# Patient Record
Sex: FEMALE | Race: BLACK OR AFRICAN AMERICAN | NOT HISPANIC OR LATINO | URBAN - METROPOLITAN AREA
[De-identification: names, ages, dates, MRNs, and addresses within clinical notes are randomized per-mention and may not be internally consistent; named-entity substitution may affect disease eponyms.]

---

## 2017-03-01 ENCOUNTER — OUTPATIENT (OUTPATIENT)
Dept: OUTPATIENT SERVICES | Facility: HOSPITAL | Age: 82
LOS: 1 days | End: 2017-03-01
Payer: MEDICAID

## 2017-03-07 DIAGNOSIS — R69 ILLNESS, UNSPECIFIED: ICD-10-CM

## 2017-06-01 PROCEDURE — G9001: CPT

## 2018-01-16 ENCOUNTER — INPATIENT (INPATIENT)
Facility: HOSPITAL | Age: 83
LOS: 1 days | Discharge: HOME | End: 2018-01-18
Attending: INTERNAL MEDICINE | Admitting: INTERNAL MEDICINE
Payer: MEDICARE

## 2018-01-16 VITALS
RESPIRATION RATE: 16 BRPM | DIASTOLIC BLOOD PRESSURE: 93 MMHG | OXYGEN SATURATION: 96 % | HEIGHT: 63 IN | TEMPERATURE: 99 F | SYSTOLIC BLOOD PRESSURE: 189 MMHG | WEIGHT: 139.99 LBS | HEART RATE: 89 BPM

## 2018-01-16 DIAGNOSIS — N60.01 SOLITARY CYST OF RIGHT BREAST: Chronic | ICD-10-CM

## 2018-01-16 DIAGNOSIS — I10 ESSENTIAL (PRIMARY) HYPERTENSION: ICD-10-CM

## 2018-01-16 DIAGNOSIS — R74.8 ABNORMAL LEVELS OF OTHER SERUM ENZYMES: ICD-10-CM

## 2018-01-16 DIAGNOSIS — E11.9 TYPE 2 DIABETES MELLITUS WITHOUT COMPLICATIONS: ICD-10-CM

## 2018-01-16 DIAGNOSIS — E78.5 HYPERLIPIDEMIA, UNSPECIFIED: ICD-10-CM

## 2018-01-16 DIAGNOSIS — E16.2 HYPOGLYCEMIA, UNSPECIFIED: ICD-10-CM

## 2018-01-16 LAB
ALBUMIN SERPL ELPH-MCNC: 2.9 G/DL — LOW (ref 3.3–5)
ALP SERPL-CCNC: 95 U/L — SIGNIFICANT CHANGE UP (ref 40–120)
ALT FLD-CCNC: 18 U/L — SIGNIFICANT CHANGE UP (ref 12–78)
ANION GAP SERPL CALC-SCNC: 8 MMOL/L — SIGNIFICANT CHANGE UP (ref 5–17)
APPEARANCE UR: CLEAR — SIGNIFICANT CHANGE UP
APTT BLD: 29.3 SEC — SIGNIFICANT CHANGE UP (ref 27.5–37.4)
AST SERPL-CCNC: 9 U/L — LOW (ref 15–37)
BACTERIA # UR AUTO: ABNORMAL
BASOPHILS # BLD AUTO: 0.1 K/UL — SIGNIFICANT CHANGE UP (ref 0–0.2)
BASOPHILS NFR BLD AUTO: 0.9 % — SIGNIFICANT CHANGE UP (ref 0–2)
BILIRUB SERPL-MCNC: 0.2 MG/DL — SIGNIFICANT CHANGE UP (ref 0.2–1.2)
BILIRUB UR-MCNC: NEGATIVE — SIGNIFICANT CHANGE UP
BUN SERPL-MCNC: 8 MG/DL — SIGNIFICANT CHANGE UP (ref 7–23)
CALCIUM SERPL-MCNC: 8.4 MG/DL — LOW (ref 8.5–10.1)
CHLORIDE SERPL-SCNC: 109 MMOL/L — HIGH (ref 96–108)
CK MB BLD-MCNC: 2.5 % — SIGNIFICANT CHANGE UP (ref 0–3.5)
CK MB CFR SERPL CALC: 1.8 NG/ML — SIGNIFICANT CHANGE UP (ref 0.5–3.6)
CK SERPL-CCNC: 73 U/L — SIGNIFICANT CHANGE UP (ref 26–192)
CO2 SERPL-SCNC: 24 MMOL/L — SIGNIFICANT CHANGE UP (ref 22–31)
COLOR SPEC: YELLOW — SIGNIFICANT CHANGE UP
CREAT SERPL-MCNC: 0.92 MG/DL — SIGNIFICANT CHANGE UP (ref 0.5–1.3)
DIFF PNL FLD: NEGATIVE — SIGNIFICANT CHANGE UP
EOSINOPHIL # BLD AUTO: 0 K/UL — SIGNIFICANT CHANGE UP (ref 0–0.5)
EOSINOPHIL NFR BLD AUTO: 0.5 % — SIGNIFICANT CHANGE UP (ref 0–6)
EPI CELLS # UR: ABNORMAL
GLUCOSE BLDC GLUCOMTR-MCNC: 117 MG/DL — HIGH (ref 70–99)
GLUCOSE BLDC GLUCOMTR-MCNC: 140 MG/DL — HIGH (ref 70–99)
GLUCOSE BLDC GLUCOMTR-MCNC: 183 MG/DL — HIGH (ref 70–99)
GLUCOSE BLDC GLUCOMTR-MCNC: 210 MG/DL — HIGH (ref 70–99)
GLUCOSE BLDC GLUCOMTR-MCNC: 93 MG/DL — SIGNIFICANT CHANGE UP (ref 70–99)
GLUCOSE SERPL-MCNC: 126 MG/DL — HIGH (ref 70–99)
GLUCOSE UR QL: NEGATIVE MG/DL — SIGNIFICANT CHANGE UP
HCT VFR BLD CALC: 34.7 % — SIGNIFICANT CHANGE UP (ref 34.5–45)
HGB BLD-MCNC: 11 G/DL — LOW (ref 11.5–15.5)
INR BLD: 1.23 RATIO — HIGH (ref 0.88–1.16)
KETONES UR-MCNC: NEGATIVE — SIGNIFICANT CHANGE UP
LACTATE SERPL-SCNC: 1.7 MMOL/L — SIGNIFICANT CHANGE UP (ref 0.7–2)
LEUKOCYTE ESTERASE UR-ACNC: ABNORMAL
LYMPHOCYTES # BLD AUTO: 1.2 K/UL — SIGNIFICANT CHANGE UP (ref 1–3.3)
LYMPHOCYTES # BLD AUTO: 12.8 % — LOW (ref 13–44)
MCHC RBC-ENTMCNC: 24.8 PG — LOW (ref 27–34)
MCHC RBC-ENTMCNC: 31.7 GM/DL — LOW (ref 32–36)
MCV RBC AUTO: 78.3 FL — LOW (ref 80–100)
MONOCYTES # BLD AUTO: 0.7 K/UL — SIGNIFICANT CHANGE UP (ref 0–0.9)
MONOCYTES NFR BLD AUTO: 7.3 % — SIGNIFICANT CHANGE UP (ref 2–14)
NEUTROPHILS # BLD AUTO: 7.4 K/UL — SIGNIFICANT CHANGE UP (ref 1.8–7.4)
NEUTROPHILS NFR BLD AUTO: 78.5 % — HIGH (ref 43–77)
NITRITE UR-MCNC: NEGATIVE — SIGNIFICANT CHANGE UP
PH UR: 5 — SIGNIFICANT CHANGE UP (ref 5–8)
PLATELET # BLD AUTO: 250 K/UL — SIGNIFICANT CHANGE UP (ref 150–400)
POTASSIUM SERPL-MCNC: 3.8 MMOL/L — SIGNIFICANT CHANGE UP (ref 3.5–5.3)
POTASSIUM SERPL-SCNC: 3.8 MMOL/L — SIGNIFICANT CHANGE UP (ref 3.5–5.3)
PROT SERPL-MCNC: 6.9 GM/DL — SIGNIFICANT CHANGE UP (ref 6–8.3)
PROT UR-MCNC: NEGATIVE MG/DL — SIGNIFICANT CHANGE UP
PROTHROM AB SERPL-ACNC: 13.5 SEC — HIGH (ref 9.8–12.7)
RBC # BLD: 4.43 M/UL — SIGNIFICANT CHANGE UP (ref 3.8–5.2)
RBC # FLD: 15 % — SIGNIFICANT CHANGE UP (ref 11–15)
SODIUM SERPL-SCNC: 141 MMOL/L — SIGNIFICANT CHANGE UP (ref 135–145)
SP GR SPEC: 1 — LOW (ref 1.01–1.02)
TROPONIN I SERPL-MCNC: 0.07 NG/ML — HIGH (ref 0.01–0.04)
TROPONIN I SERPL-MCNC: 0.07 NG/ML — HIGH (ref 0.01–0.04)
UROBILINOGEN FLD QL: NEGATIVE MG/DL — SIGNIFICANT CHANGE UP
WBC # BLD: 9.5 K/UL — SIGNIFICANT CHANGE UP (ref 3.8–10.5)
WBC # FLD AUTO: 9.5 K/UL — SIGNIFICANT CHANGE UP (ref 3.8–10.5)
WBC UR QL: SIGNIFICANT CHANGE UP

## 2018-01-16 PROCEDURE — 99285 EMERGENCY DEPT VISIT HI MDM: CPT

## 2018-01-16 PROCEDURE — 99223 1ST HOSP IP/OBS HIGH 75: CPT

## 2018-01-16 PROCEDURE — 71045 X-RAY EXAM CHEST 1 VIEW: CPT | Mod: 26

## 2018-01-16 PROCEDURE — 93010 ELECTROCARDIOGRAM REPORT: CPT

## 2018-01-16 PROCEDURE — 70450 CT HEAD/BRAIN W/O DYE: CPT | Mod: 26

## 2018-01-16 RX ORDER — DEXTROSE 50 % IN WATER 50 %
25 SYRINGE (ML) INTRAVENOUS ONCE
Qty: 0 | Refills: 0 | Status: DISCONTINUED | OUTPATIENT
Start: 2018-01-16 | End: 2018-01-18

## 2018-01-16 RX ORDER — ENOXAPARIN SODIUM 100 MG/ML
40 INJECTION SUBCUTANEOUS EVERY 24 HOURS
Qty: 0 | Refills: 0 | Status: DISCONTINUED | OUTPATIENT
Start: 2018-01-16 | End: 2018-01-18

## 2018-01-16 RX ORDER — LOSARTAN POTASSIUM 100 MG/1
100 TABLET, FILM COATED ORAL DAILY
Qty: 0 | Refills: 0 | Status: DISCONTINUED | OUTPATIENT
Start: 2018-01-16 | End: 2018-01-18

## 2018-01-16 RX ORDER — DEXTROSE 50 % IN WATER 50 %
12.5 SYRINGE (ML) INTRAVENOUS ONCE
Qty: 0 | Refills: 0 | Status: DISCONTINUED | OUTPATIENT
Start: 2018-01-16 | End: 2018-01-18

## 2018-01-16 RX ORDER — LABETALOL HCL 100 MG
20 TABLET ORAL ONCE
Qty: 0 | Refills: 0 | Status: DISCONTINUED | OUTPATIENT
Start: 2018-01-16 | End: 2018-01-16

## 2018-01-16 RX ORDER — INSULIN LISPRO 100/ML
VIAL (ML) SUBCUTANEOUS AT BEDTIME
Qty: 0 | Refills: 0 | Status: DISCONTINUED | OUTPATIENT
Start: 2018-01-16 | End: 2018-01-18

## 2018-01-16 RX ORDER — METOPROLOL TARTRATE 50 MG
50 TABLET ORAL DAILY
Qty: 0 | Refills: 0 | Status: DISCONTINUED | OUTPATIENT
Start: 2018-01-16 | End: 2018-01-18

## 2018-01-16 RX ORDER — ATORVASTATIN CALCIUM 80 MG/1
40 TABLET, FILM COATED ORAL AT BEDTIME
Qty: 0 | Refills: 0 | Status: DISCONTINUED | OUTPATIENT
Start: 2018-01-16 | End: 2018-01-18

## 2018-01-16 RX ORDER — DEXTROSE 50 % IN WATER 50 %
1 SYRINGE (ML) INTRAVENOUS ONCE
Qty: 0 | Refills: 0 | Status: DISCONTINUED | OUTPATIENT
Start: 2018-01-16 | End: 2018-01-18

## 2018-01-16 RX ORDER — GLUCAGON INJECTION, SOLUTION 0.5 MG/.1ML
1 INJECTION, SOLUTION SUBCUTANEOUS ONCE
Qty: 0 | Refills: 0 | Status: DISCONTINUED | OUTPATIENT
Start: 2018-01-16 | End: 2018-01-18

## 2018-01-16 RX ORDER — SODIUM CHLORIDE 9 MG/ML
1000 INJECTION, SOLUTION INTRAVENOUS
Qty: 0 | Refills: 0 | Status: DISCONTINUED | OUTPATIENT
Start: 2018-01-16 | End: 2018-01-18

## 2018-01-16 RX ORDER — INSULIN LISPRO 100/ML
VIAL (ML) SUBCUTANEOUS
Qty: 0 | Refills: 0 | Status: DISCONTINUED | OUTPATIENT
Start: 2018-01-16 | End: 2018-01-18

## 2018-01-16 RX ORDER — LABETALOL HCL 100 MG
10 TABLET ORAL ONCE
Qty: 0 | Refills: 0 | Status: COMPLETED | OUTPATIENT
Start: 2018-01-16 | End: 2018-01-16

## 2018-01-16 RX ORDER — PANTOPRAZOLE SODIUM 20 MG/1
40 TABLET, DELAYED RELEASE ORAL
Qty: 0 | Refills: 0 | Status: DISCONTINUED | OUTPATIENT
Start: 2018-01-16 | End: 2018-01-18

## 2018-01-16 RX ORDER — SODIUM CHLORIDE 9 MG/ML
1000 INJECTION, SOLUTION INTRAVENOUS
Qty: 0 | Refills: 0 | Status: COMPLETED | OUTPATIENT
Start: 2018-01-16 | End: 2018-01-16

## 2018-01-16 RX ORDER — HYDRALAZINE HCL 50 MG
50 TABLET ORAL DAILY
Qty: 0 | Refills: 0 | Status: DISCONTINUED | OUTPATIENT
Start: 2018-01-16 | End: 2018-01-18

## 2018-01-16 RX ORDER — ASPIRIN/CALCIUM CARB/MAGNESIUM 324 MG
325 TABLET ORAL ONCE
Qty: 0 | Refills: 0 | Status: COMPLETED | OUTPATIENT
Start: 2018-01-16 | End: 2018-01-16

## 2018-01-16 RX ADMIN — Medication 10 MILLIGRAM(S): at 12:21

## 2018-01-16 RX ADMIN — SODIUM CHLORIDE 75 MILLILITER(S): 9 INJECTION, SOLUTION INTRAVENOUS at 15:10

## 2018-01-16 RX ADMIN — Medication 50 MILLIGRAM(S): at 15:57

## 2018-01-16 RX ADMIN — LOSARTAN POTASSIUM 100 MILLIGRAM(S): 100 TABLET, FILM COATED ORAL at 17:14

## 2018-01-16 RX ADMIN — Medication 10 MILLIGRAM(S): at 22:52

## 2018-01-16 RX ADMIN — ATORVASTATIN CALCIUM 40 MILLIGRAM(S): 80 TABLET, FILM COATED ORAL at 21:53

## 2018-01-16 RX ADMIN — Medication 50 MILLIGRAM(S): at 15:58

## 2018-01-16 RX ADMIN — PANTOPRAZOLE SODIUM 40 MILLIGRAM(S): 20 TABLET, DELAYED RELEASE ORAL at 15:57

## 2018-01-16 RX ADMIN — Medication 325 MILLIGRAM(S): at 13:47

## 2018-01-16 NOTE — H&P ADULT - NSHPLABSRESULTS_GEN_ALL_CORE
11.0   9.5   )-----------( 250      ( 16 Jan 2018 11:50 )             34.7   01-16    141  |  109<H>  |  8   ----------------------------<  126<H>  3.8   |  24  |  0.92    Ca    8.4<L>      16 Jan 2018 11:50    TPro  6.9  /  Alb  2.9<L>  /  TBili  0.2  /  DBili  x   /  AST  9<L>  /  ALT  18  /  AlkPhos  95  01-16    < from: Xray Chest 1 View AP/PA. (01.16.18 @ 13:44) >    upport Devices: None  Heart: Cardiomegaly  Mediastinum:  Unremarkable  Lungs/Airways:  Unremarkable  Bones/Soft tissues: Unremarkable      < from: CT Head No Cont (01.16.18 @ 12:49) >    No acute intracranial hemorrhage, mass effect or midline shift. 11.0   9.5   )-----------( 250      ( 16 Jan 2018 11:50 )             34.7   01-16    141  |  109<H>  |  8   ----------------------------<  126<H>  3.8   |  24  |  0.92    Ca    8.4<L>      16 Jan 2018 11:50    TPro  6.9  /  Alb  2.9<L>  /  TBili  0.2  /  DBili  x   /  AST  9<L>  /  ALT  18  /  AlkPhos  95  01-16    < from: Xray Chest 1 View AP/PA. (01.16.18 @ 13:44) >    upport Devices: None  Heart: Cardiomegaly  Mediastinum:  Unremarkable  Lungs/Airways:  Unremarkable  Bones/Soft tissues: Unremarkable    ekg- assymetic t wave inversioin inf/lat       < from: CT Head No Cont (01.16.18 @ 12:49) >    No acute intracranial hemorrhage, mass effect or midline shift.

## 2018-01-16 NOTE — ED ADULT NURSE NOTE - OBJECTIVE STATEMENT
per granddaughter at bedside pt  was asleep past 10 am which is not normal for her, trying to wake her up she was confused and not making sense per son pt was c/o abdominal distention over the weekend and decrease appetite denies any urinary symptoms

## 2018-01-16 NOTE — H&P ADULT - HISTORY OF PRESENT ILLNESS
83 years old female by ems with grand daughter and son sts pt was found lethargic and confused in bed with home aid sleeping next to pt this afternoon last seen ok was last night. EMS arrived the blood sugar was low and pt received dextro 50 % one amp and pt now is back to her normal mental status. Pt sts she did not eat breakfast and lunch today. Pt also sts did not take any of her medications this morning. Pt denies headache, dizziness, blurred visions, lights sensitivities, focal/distal weakness or numbness, cough, sob, chest pain, nausea, vomiting, fever, chills, abd pain, dysuria or irregular bowel movements. 83 years old female by ems with grand daughter and son sts pt was found lethargic and confused in bed with home aid sleeping next to pt this afternoon last seen ok was last night. EMS arrived the blood sugar was low and pt received dextro 50 % one amp and pt now is back to her normal mental status. Pt sts she did not eat breakfast and lunch todaybut ate well the night before . Pt also sts did not take any of her medications this morning. patient states she had not felt well lately and was complain of headaches and arm shaking and was supposed to see her pmd later in hte week  Pt denies headache, dizziness, blurred visions, lights sensitivities, focal/distal weakness or numbness, cough, sob, chest pain, nausea, vomiting, fever, chills, abdominal pain, dysuria or irregular bowel movements.

## 2018-01-16 NOTE — ED PROVIDER NOTE - CARE PLAN
Principal Discharge DX:	Hypoglycemia Principal Discharge DX:	Hypoglycemia  Secondary Diagnosis:	Essential hypertension

## 2018-01-16 NOTE — ED PROVIDER NOTE - CONSTITUTIONAL, MLM
normal... Well appearing, well nourished, awake, alert, oriented to person, place, time/situation and in no apparent distress. Speaking in clear full sentences no nasal flaring no shoulders retractions, smiling appears very comfortable sitting up in the stretcher in a bright light room

## 2018-01-16 NOTE — H&P ADULT - ASSESSMENT
83f           IMPROVE VTE Individual Risk Assessment          RISK                                                          Points    [  ] Previous VTE                                                3    [  ] Thrombophilia                                             2    [  ] Lower limb paralysis                                    2        (unable to hold up >15 seconds)      [  ] Current Cancer                                             2         (within 6 months)    [  ] Immobilization > 24 hrs                              1    [  ] ICU/CCU stay > 24 hours                            1    [  ] Age > 60                                                    1    IMPROVE VTE Score _________ 83f           IMPROVE VTE Individual Risk Assessment          RISK                                                          Points    [  ] Previous VTE                                                3    [  ] Thrombophilia                                             2    [  ] Lower limb paralysis                                    2        (unable to hold up >15 seconds)      [  ] Current Cancer                                             2         (within 6 months)    [x  ] Immobilization > 24 hrs                              1    [  ] ICU/CCU stay > 24 hours                            1    [  x] Age > 60                                                    1    IMPROVE VTE Score _________2

## 2018-01-16 NOTE — ED ADULT NURSE NOTE - CHIEF COMPLAINT QUOTE
hypoglycemia and lethargic  at home ems gave 1 amp D50 with results pt alert speaks creole granddaughter states better, fs 140 # 20g rt ac last normal last night

## 2018-01-16 NOTE — ED PROVIDER NOTE - PROGRESS NOTE DETAILS
Pt is alert and oriented x 3 accu check 93 pt denies headache, dizziness, sob, chest pain, nausea, vomiting, abd pain.

## 2018-01-16 NOTE — ED PROVIDER NOTE - PROGRESS NOTE
He can go online and get medications from [facility] Asacol 400 mg 2 capsules twice a day. The only other alternative is azulfidine a sulfa drug which carries the disadvantage of allergic reactions. IF he would like to em this prescribe azulfidine 50 mg 2 tablets twice a day with Folic Acid 1 mg a day   Stable.

## 2018-01-16 NOTE — ED ADULT TRIAGE NOTE - CHIEF COMPLAINT QUOTE
hypoglycemia and lethargic  at home ems gave 1 amp D50 with results pt alert speaks creole granddaughter states better, fs 140 # 20g rt ac hypoglycemia and lethargic  at home ems gave 1 amp D50 with results pt alert speaks creole granddaughter states better, fs 140 # 20g rt ac last normal last night

## 2018-01-16 NOTE — ED PROVIDER NOTE - OBJECTIVE STATEMENT
83 years old female by ems with grand daughter and son sts pt was found lethargic and confused in bed with home aid sleeping next to pt this afternoon last seen ok was last night. EMS arrived the blood sugar was low and pt received dextro 50 % one amp and pt now is back to her normal mental status. Pt sts she did not eat breakfast and lunch today. Pt also sts did not take any of her medications this morning. Pt denies headache, dizziness, blurred visions, lights sensitivities, focal/distal weakness or numbness, cough, sob, chest pain, nausea, vomiting, fever, chills, abd pain, dysuria or irregular bowel movements.

## 2018-01-17 LAB
ANION GAP SERPL CALC-SCNC: 11 MMOL/L — SIGNIFICANT CHANGE UP (ref 5–17)
BUN SERPL-MCNC: 12 MG/DL — SIGNIFICANT CHANGE UP (ref 7–23)
CALCIUM SERPL-MCNC: 8.9 MG/DL — SIGNIFICANT CHANGE UP (ref 8.5–10.1)
CHLORIDE SERPL-SCNC: 106 MMOL/L — SIGNIFICANT CHANGE UP (ref 96–108)
CO2 SERPL-SCNC: 22 MMOL/L — SIGNIFICANT CHANGE UP (ref 22–31)
CREAT SERPL-MCNC: 0.82 MG/DL — SIGNIFICANT CHANGE UP (ref 0.5–1.3)
CULTURE RESULTS: SIGNIFICANT CHANGE UP
GLUCOSE BLDC GLUCOMTR-MCNC: 192 MG/DL — HIGH (ref 70–99)
GLUCOSE BLDC GLUCOMTR-MCNC: 217 MG/DL — HIGH (ref 70–99)
GLUCOSE BLDC GLUCOMTR-MCNC: 223 MG/DL — HIGH (ref 70–99)
GLUCOSE SERPL-MCNC: 209 MG/DL — HIGH (ref 70–99)
HBA1C BLD-MCNC: 6.5 % — HIGH (ref 4–5.6)
HCT VFR BLD CALC: 37.7 % — SIGNIFICANT CHANGE UP (ref 34.5–45)
HGB BLD-MCNC: 12.1 G/DL — SIGNIFICANT CHANGE UP (ref 11.5–15.5)
MCHC RBC-ENTMCNC: 24.8 PG — LOW (ref 27–34)
MCHC RBC-ENTMCNC: 32.1 GM/DL — SIGNIFICANT CHANGE UP (ref 32–36)
MCV RBC AUTO: 77.3 FL — LOW (ref 80–100)
PLATELET # BLD AUTO: 272 K/UL — SIGNIFICANT CHANGE UP (ref 150–400)
POTASSIUM SERPL-MCNC: 3.6 MMOL/L — SIGNIFICANT CHANGE UP (ref 3.5–5.3)
POTASSIUM SERPL-SCNC: 3.6 MMOL/L — SIGNIFICANT CHANGE UP (ref 3.5–5.3)
RBC # BLD: 4.87 M/UL — SIGNIFICANT CHANGE UP (ref 3.8–5.2)
RBC # FLD: 15.6 % — HIGH (ref 11–15)
SODIUM SERPL-SCNC: 139 MMOL/L — SIGNIFICANT CHANGE UP (ref 135–145)
SPECIMEN SOURCE: SIGNIFICANT CHANGE UP
TROPONIN I SERPL-MCNC: 0.03 NG/ML — SIGNIFICANT CHANGE UP (ref 0.01–0.04)
WBC # BLD: 11.6 K/UL — HIGH (ref 3.8–10.5)
WBC # FLD AUTO: 11.6 K/UL — HIGH (ref 3.8–10.5)

## 2018-01-17 PROCEDURE — 99233 SBSQ HOSP IP/OBS HIGH 50: CPT

## 2018-01-17 PROCEDURE — 99232 SBSQ HOSP IP/OBS MODERATE 35: CPT

## 2018-01-17 RX ORDER — METOPROLOL TARTRATE 50 MG
1 TABLET ORAL
Qty: 0 | Refills: 0 | COMMUNITY

## 2018-01-17 RX ORDER — OMEPRAZOLE 10 MG/1
1 CAPSULE, DELAYED RELEASE ORAL
Qty: 0 | Refills: 0 | COMMUNITY

## 2018-01-17 RX ORDER — HYDRALAZINE HCL 50 MG
0 TABLET ORAL
Qty: 0 | Refills: 0 | COMMUNITY

## 2018-01-17 RX ORDER — LOSARTAN POTASSIUM 100 MG/1
1 TABLET, FILM COATED ORAL
Qty: 0 | Refills: 0 | COMMUNITY

## 2018-01-17 RX ORDER — HYDRALAZINE HCL 50 MG
10 TABLET ORAL ONCE
Qty: 0 | Refills: 0 | Status: COMPLETED | OUTPATIENT
Start: 2018-01-17 | End: 2018-01-17

## 2018-01-17 RX ORDER — ROSUVASTATIN CALCIUM 5 MG/1
1 TABLET ORAL
Qty: 0 | Refills: 0 | COMMUNITY

## 2018-01-17 RX ADMIN — PANTOPRAZOLE SODIUM 40 MILLIGRAM(S): 20 TABLET, DELAYED RELEASE ORAL at 07:00

## 2018-01-17 RX ADMIN — LOSARTAN POTASSIUM 100 MILLIGRAM(S): 100 TABLET, FILM COATED ORAL at 07:00

## 2018-01-17 RX ADMIN — Medication 50 MILLIGRAM(S): at 07:00

## 2018-01-17 RX ADMIN — ENOXAPARIN SODIUM 40 MILLIGRAM(S): 100 INJECTION SUBCUTANEOUS at 09:19

## 2018-01-17 RX ADMIN — Medication 4: at 09:18

## 2018-01-17 RX ADMIN — Medication 2: at 16:40

## 2018-01-17 RX ADMIN — Medication 10 MILLIGRAM(S): at 01:20

## 2018-01-17 RX ADMIN — Medication 50 MILLIGRAM(S): at 07:01

## 2018-01-17 RX ADMIN — ATORVASTATIN CALCIUM 40 MILLIGRAM(S): 80 TABLET, FILM COATED ORAL at 22:07

## 2018-01-17 RX ADMIN — Medication 10 MILLIGRAM(S): at 05:28

## 2018-01-17 RX ADMIN — Medication 4: at 11:31

## 2018-01-17 NOTE — CONSULT NOTE ADULT - SUBJECTIVE AND OBJECTIVE BOX
CARDIOLOGY CONSULT NOTE    18 @ 12:16  THANG BARTH is a 83y Female with a known history of :  Elevated troponin (R74.8)  Type 2 diabetes mellitus without complication, without long-term current use of insulin (E11.9)  Dyslipidemia (E78.5)  Essential hypertension (I10)  Hypoglycemia (E16.2)    HPI:  83 years old female by ems with grand daughter and son sts pt was found lethargic and confused in bed with home aid sleeping next to pt this afternoon last seen ok was last night. EMS arrived the blood sugar was low and pt received dextro 50 % one amp and pt now is back to her normal mental status. Pt sts she did not eat breakfast and lunch todaybut ate well the night before . Pt also sts did not take any of her medications this morning. patient states she had not felt well lately and was complain of headaches and arm shaking and was supposed to see her pmd later in hte week  Pt denies headache, dizziness, blurred visions, lights sensitivities, focal/distal weakness or numbness, cough, sob, chest pain, nausea, vomiting, fever, chills, abdominal pain, dysuria or irregular bowel movements. (2018 14:48)      REVIEW OF SYSTEMS:    CONSTITUTIONAL: No fever, weight loss, or fatigue  EYES: No eye pain, visual disturbances, or discharge  ENMT:  No difficulty hearing, tinnitus, vertigo; No sinus or throat pain  NECK: No pain or stiffness  BREASTS: No pain, masses, or nipple discharge  RESPIRATORY: No cough, wheezing, chills or hemoptysis; No shortness of breath  CARDIOVASCULAR: No chest pain, palpitations, dizziness, or leg swelling  GASTROINTESTINAL: No abdominal or epigastric pain. No nausea, vomiting, or hematemesis; No diarrhea or constipation. No melena or hematochezia.  GENITOURINARY: No dysuria, frequency, hematuria, or incontinence  NEUROLOGICAL: No headaches, memory loss, loss of strength, numbness, or tremors  SKIN: No itching, burning, rashes, or lesions   LYMPH NODES: No enlarged glands  ENDOCRINE: No heat or cold intolerance; No hair loss  MUSCULOSKELETAL: No joint pain or swelling; No muscle, back, or extremity pain  PSYCHIATRIC: No depression, anxiety, mood swings, or difficulty sleeping  HEME/LYMPH: No easy bruising, or bleeding gums  ALLERGY AND IMMUNOLOGIC: No hives or eczema    MEDICATIONS  (STANDING):  atorvastatin 40 milliGRAM(s) Oral at bedtime  dextrose 5%. 1000 milliLiter(s) (50 mL/Hr) IV Continuous <Continuous>  dextrose 50% Injectable 12.5 Gram(s) IV Push once  dextrose 50% Injectable 25 Gram(s) IV Push once  dextrose 50% Injectable 25 Gram(s) IV Push once  enoxaparin Injectable 40 milliGRAM(s) SubCutaneous every 24 hours  hydrALAZINE 50 milliGRAM(s) Oral daily  insulin lispro (HumaLOG) corrective regimen sliding scale   SubCutaneous three times a day before meals  insulin lispro (HumaLOG) corrective regimen sliding scale   SubCutaneous at bedtime  losartan 100 milliGRAM(s) Oral daily  metoprolol succinate ER 50 milliGRAM(s) Oral daily  pantoprazole    Tablet 40 milliGRAM(s) Oral before breakfast    MEDICATIONS  (PRN):  dextrose Gel 1 Dose(s) Oral once PRN Blood Glucose LESS THAN 70 milliGRAM(s)/deciliter  glucagon  Injectable 1 milliGRAM(s) IntraMuscular once PRN Glucose LESS THAN 70 milligrams/deciliter    Crestor 10 mg oral tablet: 1 tab(s) orally once a day (at bedtime)  glyBURIDE 5 mg oral tablet: orally 2 times a day  hydrALAZINE 50 mg oral tablet: orally once a day  losartan 100 mg oral tablet: 1 tab(s) orally once a day  metFORMIN 1000 mg oral tablet: 1 tab(s) orally 2 times a day  Metoprolol Succinate ER 50 mg oral tablet, extended release: 1 tab(s) orally once a day  omeprazole 40 mg oral delayed release capsule: 1 cap(s) orally once a day    ALLERGIES: No Known Allergies      FAMILY HISTORY: FAMILY HISTORY:  No pertinent family history in first degree relatives      PHYSICAL EXAMINATION:  -----------------------------  T(C): 28.1 (18 @ 09:49), Max: 37.1 (18 @ 05:34)  HR: 101 (18 @ 09:49) (76 - 101)  BP: 185/91 (18 @ 09:49) (161/83 - 227/97)  RR: 18 (18 @ 09:49) (14 - 30)  SpO2: 98% (18 @ 09:49) (94% - 98%)  Wt(kg): --    Height (cm): 152.4 ( @ 02:30)  Weight (kg): 74.5 ( @ 02:30)  BMI (kg/m2): 32.1 ( @ 02:30)  BSA (m2): 1.72 ( @ 02:30)    Constitutional: well developed, normal appearance, well groomed, well nourished, no deformities and no acute distress.   Eyes: the conjunctiva exhibited no abnormalities and the eyelids demonstrated no xanthelasmas.   HEENT: normal oral mucosa, no oral pallor and no oral cyanosis.   Neck: normal jugular venous A waves present, normal jugular venous V waves present and no jugular venous lopez A waves.   Pulmonary: no respiratory distress, normal respiratory rhythm and effort, no accessory muscle use and lungs were clear to auscultation bilaterally.   Cardiovascular: heart rate and rhythm were normal, normal S1 and S2 and no murmur, gallop, rub, heave or thrill are present.   Abdomen: soft, non-tender, no hepato-splenomegaly and no abdominal mass palpated.   Musculoskeletal: the gait could not be assessed..   Extremities: no clubbing of the fingernails, no localized cyanosis, no petechial hemorrhages and no ischemic changes.   Skin: normal skin color and pigmentation, no rash, no venous stasis, no skin lesions, no skin ulcer and no xanthoma was observed.   Psychiatric: oriented to person, place, and time, the affect was normal, the mood was normal and not feeling anxious.     LABS:   --------      139  |  106  |  12  ----------------------------<  209<H>  3.6   |  22  |  0.82    Ca    8.9      2018 09:40    TPro  6.9  /  Alb  2.9<L>  /  TBili  0.2  /  DBili  x   /  AST  9<L>  /  ALT  18  /  AlkPhos  95                           12.1   11.6  )-----------( 272      ( 2018 09:40 )             37.7     PT/INR - ( 2018 11:50 )   PT: 13.5 sec;   INR: 1.23 ratio         PTT - ( 2018 11:50 )  PTT:29.3 sec     @ 09:40 CPK total:--, CKMB --, Troponin I - .030 ng/mL   @ 19:52 CPK total:--, CKMB --, Troponin I - .069 ng/mL<H>   @ 11:50 CPK total:--, CKMB --, Troponin I - .074 ng/mL<H>        CARDIAC MARKERS ( 2018 09:40 )  .030 ng/mL / x     / x     / x     / x      CARDIAC MARKERS ( 2018 19:52 )  .069 ng/mL / x     / x     / x     / x      CARDIAC MARKERS ( 2018 11:50 )  .074 ng/mL / x     / 73 U/L / x     / 1.8 ng/mL        RADIOLOGY:  -----------------  < from: Xray Chest 1 View AP/PA. (18 @ 13:44) >    EXAM:  XR CHEST AP OR PA 1V                            PROCEDURE DATE:  2018          INTERPRETATION:  Chest, single portable view    HISTORY: Chest pain, change in mental status    Comparison:     IMPRESSION:    Support Devices: None  Heart: Cardiomegaly  Mediastinum:  Unremarkable  Lungs/Airways:  Unremarkable  Bones/Soft tissues: Unremarkable    JACQUI ROBIN M.D., ATTENDING RADIOLOGIST  This document has been electronically signed. 2018  2:43PM        < from: Nuclear stress test, pharmacologic (08.24.10 @ 11:00) >    PATIENT: THANG BARTH  : 1934   AGE: 76 (F)   MR#: [id number]  STUDY DATE: 2010  LOCATION: [address]  REF. PHYSICIAN(S): Drew Ayala MD  FELLOW: Kyle Trujillo MD, MDANIS.  ------------------------------------------------------------------------  TYPE OF TEST: Stress Pharmacologic  DIAGNOSIS: Chest Pain, unspecified  ------------------------------------------------------------------------  REASON FOR TEST: chest pain  CARDIAC HISTORY: 76 years old female with atypical chest  and shoulder pain.  RISK FACTORS: Elevated Cholesterol, Hypertension, Post  Menopausal  MEDICATIONS: HCTZ, Crestor, aspirin, omeprazole,  amlodipine, atenolol  ------------------------------------------------------------------------  BASELINE ELECTROCARDIOGRAM:  Rhythm: Sinus Bradycardia - 44 BPM.  Q Waves: .  ST: LVH with repolarization abnormality  ------------------------------------------------------------------------  HEMODYNAMIC PARAMETERS:  Agent: Regadenoson 0.4 mg over 06:00 min.                    HR      BP  Baseline  Infusion        51  187/83  02:00     Infusion        64  165/73  04:00     Infusion        95  156/74  06:00     Infusion       106  154/83  04:00     Post Infusion   77  151/78  ------------------------------------------------------------------------  Baseline HR: 51 ; Baseline BP: 187/83  Peak Infusion HR:106; Peak Infusion BP:  mmHg  Base RPP:9,537; Peak RPP:0  Last caffeine intake: 12 hrs  Terminated: protocol completed  ------------------------------------------------------------------------  SYMPTOMS/FINDINGS:  transient headache  Chest Pain: No chest pain with administration of  Regadenoson  Treatment: None   HR Response: Appropriate   BP Response: Appropriate with baseline hypertension  ------------------------------------------------------------------------  ECG Abnormalities During/After Stress:  ECG changes could not be interpreted due to left  ventricular hypertrophy.  ------------------------------------------------------------------------  Arrhythmia: None  ------------------------------------------------------------------------  PROCEDURE DESCRIPTION:  13 mCi of Tc 99m Tetrofosmin were injected during stress  protocol. Approximately 45 minutes later, gated  tomographic images were obtained in a 180 degree arc from  right anterior oblique to left anterior oblique with 64  stops. The tomographic slices were reconstructed in 3  orthogonal planes (short axis, horizontal long axis and  vertical long axis).  Interpretation was performed both by  visual and quantitative analysis. Rest imaging was not  done.  ------------------------------------------------------------------------  NUCLEAR FINDINGS:  Normal study; no evidence for myocardial infarction or  ischemia.  The left ventricle was normal in size (not dilated), but  appears mildly hypertrophied.  ------------------------------------------------------------------------  GATED ANALYSIS:  Post-stress gated imaging was performed  (LVEF > 70%;  LVEDV = 63 ml.), revealing normal  LV function. RV  function appears normal.  ------------------------------------------------------------------------  IMPRESSIONS: Normal Study  * Indeterminate ECG evidence of ischemi - LVH  * Myocardial Perfusion SPECT results are normal.  * Nodefects at stress.  No clear evidence of ischemia or  infarct.  * Post-stress gated imaging was performed  (LVEF > 70%;  LVEDV = 63 ml.), revealing normal  LV function. RV  function appears normal.  ------------------------------------------------------------------------  Confirmed on  2010 - 16:54:37 by Eloy Moseley M.D.  ------------------------------------------------------------------------    < end of copied text >    ECG: CARDIOLOGY CONSULT NOTE    18 @ 12:16  THANG BARTH is a 83y Female with a known history of :  Elevated troponin (R74.8)  Type 2 diabetes mellitus without complication, without long-term current use of insulin (E11.9)  Dyslipidemia (E78.5)  Essential hypertension (I10)  Hypoglycemia (E16.2)    HPI:  83 years old female by ems with grand daughter and son sts pt was found lethargic and confused in bed with home aid sleeping next to pt this afternoon last seen ok was last night. EMS arrived the blood sugar was low and pt received dextro 50 % one amp and pt now is back to her normal mental status. Pt sts she did not eat breakfast and lunch today but ate well the night before . Pt also sts did not take any of her medications this morning. patient states she had not felt well lately and was complain of headaches and arm shaking and was supposed to see her pmd later in hte week  Pt denies headache, dizziness, blurred visions, lights sensitivities, focal/distal weakness or numbness, cough, sob, chest pain, nausea, vomiting, fever, chills, abdominal pain, dysuria or irregular bowel movements.  Currently her only complaint is lower back pain.      REVIEW OF SYSTEMS:    CONSTITUTIONAL: No fever, weight loss, or fatigue  EYES: No eye pain, visual disturbances, or discharge  ENMT:  No difficulty hearing, tinnitus, vertigo; No sinus or throat pain  NECK: No pain or stiffness  BREASTS: No pain, masses, or nipple discharge  RESPIRATORY: No cough, wheezing, chills or hemoptysis; No shortness of breath  CARDIOVASCULAR: No chest pain, palpitations, dizziness, or leg swelling  GASTROINTESTINAL: No abdominal or epigastric pain. No nausea, vomiting, or hematemesis; No diarrhea or constipation. No melena or hematochezia.  GENITOURINARY: No dysuria, frequency, hematuria, or incontinence  NEUROLOGICAL: No headaches, memory loss, loss of strength, numbness, or tremors  SKIN: No itching, burning, rashes, or lesions   LYMPH NODES: No enlarged glands  ENDOCRINE: No heat or cold intolerance; No hair loss  MUSCULOSKELETAL: No joint pain or swelling; No muscle, back, or extremity pain  PSYCHIATRIC: No depression, anxiety, mood swings, or difficulty sleeping  HEME/LYMPH: No easy bruising, or bleeding gums  ALLERGY AND IMMUNOLOGIC: No hives or eczema    MEDICATIONS  (STANDING):  atorvastatin 40 milliGRAM(s) Oral at bedtime  dextrose 5%. 1000 milliLiter(s) (50 mL/Hr) IV Continuous <Continuous>  dextrose 50% Injectable 12.5 Gram(s) IV Push once  dextrose 50% Injectable 25 Gram(s) IV Push once  dextrose 50% Injectable 25 Gram(s) IV Push once  enoxaparin Injectable 40 milliGRAM(s) SubCutaneous every 24 hours  hydrALAZINE 50 milliGRAM(s) Oral daily  insulin lispro (HumaLOG) corrective regimen sliding scale   SubCutaneous three times a day before meals  insulin lispro (HumaLOG) corrective regimen sliding scale   SubCutaneous at bedtime  losartan 100 milliGRAM(s) Oral daily  metoprolol succinate ER 50 milliGRAM(s) Oral daily  pantoprazole    Tablet 40 milliGRAM(s) Oral before breakfast    MEDICATIONS  (PRN):  dextrose Gel 1 Dose(s) Oral once PRN Blood Glucose LESS THAN 70 milliGRAM(s)/deciliter  glucagon  Injectable 1 milliGRAM(s) IntraMuscular once PRN Glucose LESS THAN 70 milligrams/deciliter    Crestor 10 mg oral tablet: 1 tab(s) orally once a day (at bedtime)  glyBURIDE 5 mg oral tablet: orally 2 times a day  hydrALAZINE 50 mg oral tablet: orally once a day  losartan 100 mg oral tablet: 1 tab(s) orally once a day  metFORMIN 1000 mg oral tablet: 1 tab(s) orally 2 times a day  Metoprolol Succinate ER 50 mg oral tablet, extended release: 1 tab(s) orally once a day  omeprazole 40 mg oral delayed release capsule: 1 cap(s) orally once a day    ALLERGIES: No Known Allergies      FAMILY HISTORY: FAMILY HISTORY:  No pertinent family history in first degree relatives      PHYSICAL EXAMINATION:  -----------------------------  T(C): 28.1 (18 @ 09:49), Max: 37.1 (18 @ 05:34)  HR: 101 (18 @ 09:49) (76 - 101)  BP: 185/91 (18 @ 09:49) (161/83 - 227/97)  RR: 18 (18 @ 09:49) (14 - 30)  SpO2: 98% (18 @ 09:49) (94% - 98%)  Wt(kg): --    Height (cm): 152.4 ( @ 02:30)  Weight (kg): 74.5 ( @ 02:30)  BMI (kg/m2): 32.1 ( @ 02:30)  BSA (m2): 1.72 ( @ 02:30)    Constitutional: well developed, normal appearance, well groomed, well nourished, no deformities and no acute distress.   Eyes: the conjunctiva exhibited no abnormalities and the eyelids demonstrated no xanthelasmas.   HEENT: normal oral mucosa, no oral pallor and no oral cyanosis.   Neck: normal jugular venous A waves present, normal jugular venous V waves present and no jugular venous lopez A waves.   Pulmonary: no respiratory distress, normal respiratory rhythm and effort, no accessory muscle use and lungs were clear to auscultation bilaterally.   Cardiovascular: heart rate and rhythm were normal, normal S1 and S2 and no murmur, gallop, rub, heave or thrill are present.   Abdomen: soft, non-tender, no hepato-splenomegaly and no abdominal mass palpated.   Musculoskeletal: the gait could not be assessed..   Extremities: no clubbing of the fingernails, no localized cyanosis, no petechial hemorrhages and no ischemic changes.   Skin: normal skin color and pigmentation, no rash, no venous stasis, no skin lesions, no skin ulcer and no xanthoma was observed.   Psychiatric: oriented to person, place, and time, the affect was normal, the mood was normal and not feeling anxious.     LABS:   --------      139  |  106  |  12  ----------------------------<  209<H>  3.6   |  22  |  0.82    Ca    8.9      2018 09:40    TPro  6.9  /  Alb  2.9<L>  /  TBili  0.2  /  DBili  x   /  AST  9<L>  /  ALT  18  /  AlkPhos  95                           12.1   11.6  )-----------( 272      ( 2018 09:40 )             37.7     PT/INR - ( 2018 11:50 )   PT: 13.5 sec;   INR: 1.23 ratio         PTT - ( 2018 11:50 )  PTT:29.3 sec     @ 09:40 CPK total:--, CKMB --, Troponin I - .030 ng/mL   @ 19:52 CPK total:--, CKMB --, Troponin I - .069 ng/mL<H>   @ 11:50 CPK total:--, CKMB --, Troponin I - .074 ng/mL<H>        CARDIAC MARKERS ( 2018 09:40 )  .030 ng/mL / x     / x     / x     / x      CARDIAC MARKERS ( 2018 19:52 )  .069 ng/mL / x     / x     / x     / x      CARDIAC MARKERS ( 2018 11:50 )  .074 ng/mL / x     / 73 U/L / x     / 1.8 ng/mL        RADIOLOGY:  -----------------  < from: Xray Chest 1 View AP/PA. (18 @ 13:44) >    EXAM:  XR CHEST AP OR PA 1V                            PROCEDURE DATE:  2018          INTERPRETATION:  Chest, single portable view    HISTORY: Chest pain, change in mental status    Comparison:     IMPRESSION:    Support Devices: None  Heart: Cardiomegaly  Mediastinum:  Unremarkable  Lungs/Airways:  Unremarkable  Bones/Soft tissues: Unremarkable    JACQUI ROBIN M.D., ATTENDING RADIOLOGIST  This document has been electronically signed. 2018  2:43PM        < from: Nuclear stress test, pharmacologic (08.24.10 @ 11:00) >    PATIENT: THANG BARTH  : 1934   AGE: 76 (F)   MR#: [id number]  STUDY DATE: 2010  LOCATION: [address]  REF. PHYSICIAN(S): Drew Ayala MD  FELLOW: Kyle Trujillo MD, MDANIS.  ------------------------------------------------------------------------  TYPE OF TEST: Stress Pharmacologic  DIAGNOSIS: Chest Pain, unspecified  ------------------------------------------------------------------------  REASON FOR TEST: chest pain  CARDIAC HISTORY: 76 years old female with atypical chest  and shoulder pain.  RISK FACTORS: Elevated Cholesterol, Hypertension, Post  Menopausal  MEDICATIONS: HCTZ, Crestor, aspirin, omeprazole,  amlodipine, atenolol  ------------------------------------------------------------------------  BASELINE ELECTROCARDIOGRAM:  Rhythm: Sinus Bradycardia - 44 BPM.  Q Waves: .  ST: LVH with repolarization abnormality  ------------------------------------------------------------------------  HEMODYNAMIC PARAMETERS:  Agent: Regadenoson 0.4 mg over 06:00 min.                    HR      BP  Baseline  Infusion        51  187/83  02:00     Infusion        64  165/73  04:00     Infusion        95  156/74  06:00     Infusion       106  154/83  04:00     Post Infusion   77  151/78  ------------------------------------------------------------------------  Baseline HR: 51 ; Baseline BP: 187/83  Peak Infusion HR:106; Peak Infusion BP:  mmHg  Base RPP:9,537; Peak RPP:0  Last caffeine intake: 12 hrs  Terminated: protocol completed  ------------------------------------------------------------------------  SYMPTOMS/FINDINGS:  transient headache  Chest Pain: No chest pain with administration of  Regadenoson  Treatment: None   HR Response: Appropriate   BP Response: Appropriate with baseline hypertension  ------------------------------------------------------------------------  ECG Abnormalities During/After Stress:  ECG changes could not be interpreted due to left  ventricular hypertrophy.  ------------------------------------------------------------------------  Arrhythmia: None  ------------------------------------------------------------------------  PROCEDURE DESCRIPTION:  13 mCi of Tc 99m Tetrofosmin were injected during stress  protocol. Approximately 45 minutes later, gated  tomographic images were obtained in a 180 degree arc from  right anterior oblique to left anterior oblique with 64  stops. The tomographic slices were reconstructed in 3  orthogonal planes (short axis, horizontal long axis and  vertical long axis).  Interpretation was performed both by  visual and quantitative analysis. Rest imaging was not  done.  ------------------------------------------------------------------------  NUCLEAR FINDINGS:  Normal study; no evidence for myocardial infarction or  ischemia.  The left ventricle was normal in size (not dilated), but  appears mildly hypertrophied.  ------------------------------------------------------------------------  GATED ANALYSIS:  Post-stress gated imaging was performed  (LVEF > 70%;  LVEDV = 63 ml.), revealing normal  LV function. RV  function appears normal.  ------------------------------------------------------------------------  IMPRESSIONS: Normal Study  * Indeterminate ECG evidence of ischemi - LVH  * Myocardial Perfusion SPECT results are normal.  * Nodefects at stress.  No clear evidence of ischemia or  infarct.  * Post-stress gated imaging was performed  (LVEF > 70%;  LVEDV = 63 ml.), revealing normal  LV function. RV  function appears normal.  ------------------------------------------------------------------------  Confirmed on  2010 - 16:54:37 by Eloy Moseley M.D.  ------------------------------------------------------------------------    < end of copied text >    ECG: NSR, NSSTW changes

## 2018-01-17 NOTE — PHYSICAL THERAPY INITIAL EVALUATION ADULT - CRITERIA FOR SKILLED THERAPEUTIC INTERVENTIONS
home with outpatient PT./anticipated discharge recommendation/impairments found/functional limitations in following categories/risk reduction/prevention/therapy frequency/predicted duration of therapy intervention

## 2018-01-17 NOTE — PROGRESS NOTE ADULT - SUBJECTIVE AND OBJECTIVE BOX
Patient is a 83y old  Female who presents with a chief complaint of     INTERVAL HPI/OVERNIGHT EVENTS: no events overnight     MEDICATIONS  (STANDING):  atorvastatin 40 milliGRAM(s) Oral at bedtime  dextrose 5%. 1000 milliLiter(s) (50 mL/Hr) IV Continuous <Continuous>  dextrose 50% Injectable 12.5 Gram(s) IV Push once  dextrose 50% Injectable 25 Gram(s) IV Push once  dextrose 50% Injectable 25 Gram(s) IV Push once  enoxaparin Injectable 40 milliGRAM(s) SubCutaneous every 24 hours  hydrALAZINE 50 milliGRAM(s) Oral daily  insulin lispro (HumaLOG) corrective regimen sliding scale   SubCutaneous three times a day before meals  insulin lispro (HumaLOG) corrective regimen sliding scale   SubCutaneous at bedtime  losartan 100 milliGRAM(s) Oral daily  metoprolol succinate ER 50 milliGRAM(s) Oral daily  pantoprazole    Tablet 40 milliGRAM(s) Oral before breakfast    MEDICATIONS  (PRN):  dextrose Gel 1 Dose(s) Oral once PRN Blood Glucose LESS THAN 70 milliGRAM(s)/deciliter  glucagon  Injectable 1 milliGRAM(s) IntraMuscular once PRN Glucose LESS THAN 70 milligrams/deciliter      Allergies    No Known Allergies    Intolerances          Vital Signs Last 24 Hrs  T(C): 28.1 (2018 09:49), Max: 37.1 (2018 05:34)  T(F): 82.5 (2018 09:49), Max: 98.8 (2018 05:34)  HR: 101 (2018 09:49) (76 - 101)  BP: 185/91 (2018 09:49) (161/83 - 227/97)  BP(mean): --  RR: 18 (2018 09:49) (14 - 30)  SpO2: 98% (2018 09:49) (94% - 98%)    PHYSICAL EXAM:  GENERAL: NAD, well-groomed, well-developed  HEAD:  Atraumatic, Normocephalic  EYES: EOMI, PERRLA, conjunctiva and sclera clear  ENMT: No tonsillar erythema, exudates, or enlargement; Moist mucous membranes, Good dentition, No lesions  NECK: Supple, No JVD, Normal thyroid  NERVOUS SYSTEM:  Alert & Oriented X1 Motor Strength 5/5 B/L upper and lower extremities; DTRs 2+ intact and symmetric  CHEST/LUNG: Clear to percussion bilaterally; No rales, rhonchi, wheezing, or rubs  HEART: Regular rate and rhythm; No murmurs, rubs, or gallops  ABDOMEN: Soft, Nontender, Nondistended; Bowel sounds present  EXTREMITIES:  2+ Peripheral Pulses, No clubbing, cyanosis, or edema  LYMPH: No lymphadenopathy noted  SKIN: No rashes or lesions    LABS:                        12.1   11.6  )-----------( 272      ( 2018 09:40 )             37.7         139  |  106  |  12  ----------------------------<  209<H>  3.6   |  22  |  0.82    Ca    8.9      2018 09:40    TPro  6.9  /  Alb  2.9<L>  /  TBili  0.2  /  DBili  x   /  AST  9<L>  /  ALT  18  /  AlkPhos  95  01-16    PT/INR - ( 2018 11:50 )   PT: 13.5 sec;   INR: 1.23 ratio         PTT - ( 2018 11:50 )  PTT:29.3 sec  Urinalysis Basic - ( 2018 14:54 )    Color: Yellow / Appearance: Clear / S.005 / pH: x  Gluc: x / Ketone: Negative  / Bili: Negative / Urobili: Negative mg/dL   Blood: x / Protein: Negative mg/dL / Nitrite: Negative   Leuk Esterase: Trace / RBC: x / WBC 3-5   Sq Epi: x / Non Sq Epi: Moderate / Bacteria: Few      CAPILLARY BLOOD GLUCOSE      POCT Blood Glucose.: 223 mg/dL (2018 11:30)  POCT Blood Glucose.: 217 mg/dL (2018 07:51)  POCT Blood Glucose.: 192 mg/dL (2018 03:45)  POCT Blood Glucose.: 183 mg/dL (2018 23:13)  POCT Blood Glucose.: 210 mg/dL (2018 21:39)  POCT Blood Glucose.: 117 mg/dL (2018 15:20)      RADIOLOGY & ADDITIONAL TESTS:    Imaging Personally Reviewed:  [ ] YES  [ ] NO    Consultant(s) Notes Reviewed:  [ ] YES  [ ] NO    Care Discussed with Consultants/Other Providers [ ] YES  [ ] NO

## 2018-01-17 NOTE — PHYSICAL THERAPY INITIAL EVALUATION ADULT - ADDITIONAL COMMENTS
Pt has HHA 6 days a week to assist c ADL's until daughter arrives home from work each day. Pt has a full flight of steps c bilateral railings.

## 2018-01-17 NOTE — PROGRESS NOTE ADULT - ASSESSMENT
83f brought in by ems with grand daughter and son sts pt was found lethargic and confused in bed with home aid sleeping next to pt this afternoon last seen ok was last night. EMS arrived the blood sugar was low and pt received dextro 50 % one amp and pt now is back to her normal mental status. Pt sts she did not eat breakfast and lunch todaybut ate well the night before . Pt also sts did not take any of her medications this morning. patient states she had not felt well lately and was complain of headaches and arm shaking and was supposed to see her pmd later in hte week  P

## 2018-01-17 NOTE — PHYSICAL THERAPY INITIAL EVALUATION ADULT - GENERAL OBSERVATIONS, REHAB EVAL
Pt seen sitting in bedside chair, alert and confused, on enhanced observation c CNA at bedside. Pt /80, , O2 100% on room air.

## 2018-01-17 NOTE — PHYSICAL THERAPY INITIAL EVALUATION ADULT - GAIT DEVIATIONS NOTED, PT EVAL
decreased stride length/decreased step length/decreased velocity of limb motion/decreased asad/increased time in double stance

## 2018-01-17 NOTE — CONSULT NOTE ADULT - ASSESSMENT
82 yo female with AMS.      Problem/Plan - 1:  ·  Problem: Hypoglycemia possible cause for AMS.  Plan: hold oral hypoglycemics, f/u a1c.     Problem/Plan - 2:  ·  Problem: Essential hypertension.  Plan: restart home meds. Add CACB's if further med needed.    Problem/Plan - 3:  ·  Problem: Dyslipidemia.  Plan: statin.     Problem/Plan - 4:  ·  Problem: Type 2 diabetes mellitus without complication, without long-term current use of insulin.  Plan: as above.     Problem/Plan - 5:  ·  Problem: Elevated troponin.  No acute ECG changes, no pathognomonic curve present. Patient otherwise asymptomatic from CV viewpoint. Out patient w/u acceptable .Can d/c telemetry.

## 2018-01-17 NOTE — PHYSICAL THERAPY INITIAL EVALUATION ADULT - MODIFIED CLINICAL TEST OF SENSORY INTEGRATION IN BALANCE TEST
Barthel Index: Feeding Score _10__, Bathing Score __0_, Grooming Score 0___, Dressing Score __5_, Bowels Score __10_, Bladder Score _10__, Toilet Score _10__, Transfers Score __15_, Mobility Score ___,10 Stairs Score __5_,     Total Score __75_

## 2018-01-18 ENCOUNTER — TRANSCRIPTION ENCOUNTER (OUTPATIENT)
Age: 83
End: 2018-01-18

## 2018-01-18 VITALS
SYSTOLIC BLOOD PRESSURE: 174 MMHG | OXYGEN SATURATION: 100 % | TEMPERATURE: 97 F | HEART RATE: 82 BPM | DIASTOLIC BLOOD PRESSURE: 72 MMHG | RESPIRATION RATE: 18 BRPM

## 2018-01-18 PROCEDURE — 99239 HOSP IP/OBS DSCHRG MGMT >30: CPT

## 2018-01-18 RX ORDER — GLYBURIDE 5 MG
0 TABLET ORAL
Qty: 0 | Refills: 0 | COMMUNITY

## 2018-01-18 RX ORDER — METFORMIN HYDROCHLORIDE 850 MG/1
1 TABLET ORAL
Qty: 30 | Refills: 0 | OUTPATIENT
Start: 2018-01-18 | End: 2018-02-16

## 2018-01-18 RX ORDER — METFORMIN HYDROCHLORIDE 850 MG/1
1 TABLET ORAL
Qty: 0 | Refills: 0 | COMMUNITY

## 2018-01-18 RX ADMIN — Medication 50 MILLIGRAM(S): at 05:44

## 2018-01-18 RX ADMIN — PANTOPRAZOLE SODIUM 40 MILLIGRAM(S): 20 TABLET, DELAYED RELEASE ORAL at 05:44

## 2018-01-18 RX ADMIN — LOSARTAN POTASSIUM 100 MILLIGRAM(S): 100 TABLET, FILM COATED ORAL at 05:44

## 2018-01-18 NOTE — DISCHARGE NOTE ADULT - PLAN OF CARE
medication change, stop glyburide, decrease metformin medication change, stop glyburide, decrease metformin  f/u pcp, acitivity as tolerated   A1c is 6.5,

## 2018-01-18 NOTE — DISCHARGE NOTE ADULT - CARE PLAN
Principal Discharge DX:	Hypoglycemia  Goal:	medication change, stop glyburide, decrease metformin  Assessment and plan of treatment:	medication change, stop glyburide, decrease metformin  f/u pcp, acitivity as tolerated   A1c is 6.5,  Secondary Diagnosis:	Dyslipidemia  Secondary Diagnosis:	Elevated troponin  Secondary Diagnosis:	Essential hypertension  Secondary Diagnosis:	Type 2 diabetes mellitus without complication, without long-term current use of insulin

## 2018-01-18 NOTE — DISCHARGE NOTE ADULT - HOSPITAL COURSE
83f brought in by ems with grand daughter and son sts pt was found lethargic and confused in bed with home aid sleeping next to pt this afternoon last seen ok was last night. EMS arrived the blood sugar was low and pt received dextro 50 % one amp and pt now is back to her normal mental status. Pt sts she did not eat breakfast and lunch todaybut ate well the night before . Pt also sts did not take any of her medications this morning. patient states she had not felt well lately and was complain of headaches and arm shaking and was supposed to see her pmd later in hte week  P           Problem/Plan - 1:  ·  Problem: Hypoglycemia.  Plan:a1c 6.5, stop glyburide, decrease dose of metformin      Problem/Plan - 2:  ·  Problem: Essential hypertension.  Plan: restart homemeds.      Problem/Plan - 3:  ·  Problem: Dyslipidemia.  Plan: statin.      Problem/Plan - 4:  ·  Problem: Type 2 diabetes mellitus without complication, without long-term current use of insulin.  Plan: as above.      Problem/Plan - 5:  ·  Problem: Elevated troponin.  Plan: trop x 3   trending down  cardio eval appreciated    Attending Attestation:   I was physically present for the key portions of the evaluation and management (E/M) service provided.  I agree with the above history, physical, and plan which I have reviewed and edited where appropriate.     45 minutes spent on total dc encounter; more than 50% of the visit was spent counseling and/or coordinating care by the attending physician.

## 2018-01-18 NOTE — DISCHARGE NOTE ADULT - MEDICATION SUMMARY - MEDICATIONS TO TAKE
I will START or STAY ON the medications listed below when I get home from the hospital:    losartan 100 mg oral tablet  -- 1 tab(s) by mouth once a day  -- Indication: For Essential hypertension    Fortamet 500 mg oral tablet, extended release  -- 1 tab(s) by mouth once a day   -- Check with your doctor before becoming pregnant.  Do not drink alcoholic beverages when taking this medication.  Obtain medical advice before taking any non-prescription drugs as some may affect the action of this medication.  Swallow whole.  Do not crush.  Take with food or milk.    -- Indication: For Dm    Crestor 10 mg oral tablet  -- 1 tab(s) by mouth once a day (at bedtime)  -- Indication: For Hld    Metoprolol Succinate ER 50 mg oral tablet, extended release  -- 1 tab(s) by mouth once a day  -- Indication: For Essential hypertension    omeprazole 40 mg oral delayed release capsule  -- 1 cap(s) by mouth once a day  -- Indication: For preventive    hydrALAZINE 50 mg oral tablet  -- orally once a day  -- Indication: For Essential hypertension I will START or STAY ON the medications listed below when I get home from the hospital:    Outpatient Physical Therapy  -- 1 application between cheek and gums once a day   -- Indication: For pt    losartan 100 mg oral tablet  -- 1 tab(s) by mouth once a day  -- Indication: For Essential hypertension    Fortamet 500 mg oral tablet, extended release  -- 1 tab(s) by mouth once a day   -- Check with your doctor before becoming pregnant.  Do not drink alcoholic beverages when taking this medication.  Obtain medical advice before taking any non-prescription drugs as some may affect the action of this medication.  Swallow whole.  Do not crush.  Take with food or milk.    -- Indication: For Dm    Crestor 10 mg oral tablet  -- 1 tab(s) by mouth once a day (at bedtime)  -- Indication: For Hld    Metoprolol Succinate ER 50 mg oral tablet, extended release  -- 1 tab(s) by mouth once a day  -- Indication: For Essential hypertension    omeprazole 40 mg oral delayed release capsule  -- 1 cap(s) by mouth once a day  -- Indication: For preventive    hydrALAZINE 50 mg oral tablet  -- orally once a day  -- Indication: For Essential hypertension

## 2018-01-18 NOTE — DISCHARGE NOTE ADULT - SECONDARY DIAGNOSIS.
Dyslipidemia Elevated troponin Essential hypertension Type 2 diabetes mellitus without complication, without long-term current use of insulin

## 2018-01-18 NOTE — DISCHARGE NOTE ADULT - MEDICATION SUMMARY - MEDICATIONS TO STOP TAKING
I will STOP taking the medications listed below when I get home from the hospital:    glyBURIDE 5 mg oral tablet  -- orally 2 times a day

## 2018-01-18 NOTE — DISCHARGE NOTE ADULT - PATIENT PORTAL LINK FT
“You can access the [facility] Patient Portal, offered by [facility], by registering with the following website: [email or web address]”

## 2018-01-22 DIAGNOSIS — E11.649 TYPE 2 DIABETES MELLITUS WITH HYPOGLYCEMIA WITHOUT COMA: ICD-10-CM

## 2018-01-22 DIAGNOSIS — Z79.4 LONG TERM (CURRENT) USE OF INSULIN: ICD-10-CM

## 2018-01-22 DIAGNOSIS — E78.5 HYPERLIPIDEMIA, UNSPECIFIED: ICD-10-CM

## 2018-01-22 DIAGNOSIS — I10 ESSENTIAL (PRIMARY) HYPERTENSION: ICD-10-CM

## 2018-01-22 DIAGNOSIS — Z91.14 PATIENT'S OTHER NONCOMPLIANCE WITH MEDICATION REGIMEN: ICD-10-CM

## 2022-07-26 NOTE — H&P ADULT - PROBLEM SELECTOR PROBLEM 1
Start incorporating increasing level of intensity of exercise and amount of weight you are lifting to tolerance. Rest with increase in symptoms. Yoga with Nicolasa on youtube. Playlists. Choose videos under \"yoga for mental health\" or \"grounding\". If any movement is too difficult, skip it. Do these exercises 1-2 times each day. 1. Hold string out from edge of nose, beads spaced out. Look at bead one and the string in front of it should double. Look at bead 2 (middle bead) and you should see an X, Look at bead three (furthest bead from you) and you should see a V (the other 2 beads will be doubled) Do this 3-5 times. 2. Stand tall arms length away from target on wall at eye level. Close your eyes and imagine you are still looking at the target. Turn your head a small amount to the right with your eyes closed but still looking at the target. Open your eyes to make sure they are still on the target. Close them and return your head to the middle. Open your eyes to make sure you are on the target. Repeat to the left. Do 5 times to each side. Repeat up and down 5 times each direction as well. 3. Stand up tall with hands on wall and feet firmly on ground. Close eyes and pay attention to feeling of hands and feet on support surfaces. Push the wall away through your arms. With your eyes closed, lift one hand off the wall. Return hand to wall and repeat with the other arm. Alternate until you have performed 10 on each side. Eyes closed the entire time.
Hypoglycemia